# Patient Record
Sex: FEMALE | Race: WHITE
[De-identification: names, ages, dates, MRNs, and addresses within clinical notes are randomized per-mention and may not be internally consistent; named-entity substitution may affect disease eponyms.]

---

## 2019-02-04 ENCOUNTER — HOSPITAL ENCOUNTER (OUTPATIENT)
Age: 39
End: 2019-02-04
Payer: COMMERCIAL

## 2019-02-04 DIAGNOSIS — D64.9: Primary | ICD-10-CM

## 2019-02-04 DIAGNOSIS — E55.9: ICD-10-CM

## 2019-02-04 DIAGNOSIS — R53.83: ICD-10-CM

## 2019-02-04 LAB
ALBUMIN LEVEL: 4 GM/DL (ref 3.4–5)
ALBUMIN/GLOB SERPL: 1.1 {RATIO} (ref 1.1–1.8)
ALP ISO SERPL-ACNC: 96 U/L (ref 46–116)
ALT SERPLBLD-CCNC: 42 U/L (ref 12–78)
ANION GAP SERPL CALC-SCNC: 16.8 MEQ/L (ref 5–15)
AST SERPL QL: 19 U/L (ref 15–37)
BILIRUBIN,TOTAL: 0.3 MG/DL (ref 0.2–1)
BUN SERPL-MCNC: 11 MG/DL (ref 7–18)
CALCIUM SPEC-MCNC: 9 MG/DL (ref 8.5–10.1)
CHLORIDE SPEC-SCNC: 99 MMOL/L (ref 98–107)
CHOLEST SPEC-SCNC: 188 MG/DL (ref 140–200)
CO2 SERPL-SCNC: 25 MMOL/L (ref 21–32)
CREAT BLD-SCNC: 0.88 MG/DL (ref 0.55–1.02)
ESTIMATED GLOMERULAR FILT RATE: 73 ML/MIN (ref 60–?)
FERRITIN SERPL-MCNC: 42 NG/ML (ref 8–388)
GFR (AFRICAN AMERICAN): 88 ML/MIN (ref 60–?)
GLOBULIN SER CALC-MCNC: 3.8 GM/DL (ref 1.3–3.2)
GLUCOSE: 131 MG/DL (ref 74–106)
HBA1C MFR BLD: 6.7 % (ref 0–7)
HCT VFR BLD CALC: 41.9 % (ref 37–47)
HDLC SERPL-MCNC: 37 MG/DL (ref 29–89)
HGB BLD-MCNC: 13.8 G/DL (ref 12.2–16.2)
MCHC RBC-ENTMCNC: 32.9 G/DL (ref 31.8–35.4)
MCV RBC: 83.4 FL (ref 81–99)
MEAN CORPUSCULAR HEMOGLOBIN: 27.5 PG (ref 27–31.2)
PLATELET # BLD: 342 K/MM3 (ref 142–424)
POTASSIUM: 3.8 MMOL/L (ref 3.5–5.1)
PROT SERPL-MCNC: 7.8 GM/DL (ref 6.4–8.2)
RBC # BLD AUTO: 5.02 M/MM3 (ref 4.2–5.4)
SODIUM SPEC-SCNC: 137 MMOL/L (ref 136–145)
TRIGLYCERIDES: 199 MG/DL (ref 30–200)
TSH SERPL-ACNC: 3.98 UIU/ML (ref 0.36–3.74)
WBC # BLD AUTO: 9.3 K/MM3 (ref 4.8–10.8)

## 2019-02-04 PROCEDURE — 80061 LIPID PANEL: CPT

## 2019-02-04 PROCEDURE — 36415 COLL VENOUS BLD VENIPUNCTURE: CPT

## 2019-02-04 PROCEDURE — 82728 ASSAY OF FERRITIN: CPT

## 2019-02-04 PROCEDURE — 85025 COMPLETE CBC W/AUTO DIFF WBC: CPT

## 2019-02-04 PROCEDURE — 82652 VIT D 1 25-DIHYDROXY: CPT

## 2019-02-04 PROCEDURE — 80053 COMPREHEN METABOLIC PANEL: CPT

## 2019-02-04 PROCEDURE — 82607 VITAMIN B-12: CPT

## 2019-02-04 PROCEDURE — 84443 ASSAY THYROID STIM HORMONE: CPT

## 2019-02-04 PROCEDURE — 83036 HEMOGLOBIN GLYCOSYLATED A1C: CPT

## 2019-02-05 LAB — VITAMIN B12: 590 PG/ML (ref 232–1245)

## 2019-05-01 ENCOUNTER — HOSPITAL ENCOUNTER (OUTPATIENT)
Age: 39
End: 2019-05-01
Payer: COMMERCIAL

## 2019-05-01 DIAGNOSIS — M20.42: ICD-10-CM

## 2019-05-01 DIAGNOSIS — M20.41: Primary | ICD-10-CM

## 2019-05-01 PROCEDURE — 73630 X-RAY EXAM OF FOOT: CPT

## 2019-05-31 ENCOUNTER — HOSPITAL ENCOUNTER (EMERGENCY)
Age: 39
Discharge: HOME | End: 2019-05-31
Payer: COMMERCIAL

## 2019-05-31 VITALS
TEMPERATURE: 98.2 F | SYSTOLIC BLOOD PRESSURE: 128 MMHG | RESPIRATION RATE: 18 BRPM | HEART RATE: 86 BPM | DIASTOLIC BLOOD PRESSURE: 78 MMHG | OXYGEN SATURATION: 96 %

## 2019-05-31 VITALS
DIASTOLIC BLOOD PRESSURE: 78 MMHG | RESPIRATION RATE: 18 BRPM | HEART RATE: 86 BPM | OXYGEN SATURATION: 96 % | TEMPERATURE: 98.2 F | SYSTOLIC BLOOD PRESSURE: 128 MMHG

## 2019-05-31 VITALS — BODY MASS INDEX: 41.3 KG/M2
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
## 2019-05-31 DIAGNOSIS — S61.031A: Primary | ICD-10-CM
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
## 2019-05-31 LAB
ALBUMIN LEVEL: 4 GM/DL (ref 3.4–5)
ALP ISO SERPL-ACNC: 82 U/L (ref 46–116)
ALT SERPLBLD-CCNC: 33 U/L (ref 12–78)
APTT PPP: 27.9 SECONDS (ref 23.6–34)
AST SERPL QL: 18 U/L (ref 15–37)
BILIRUB DIRECT SERPL-MCNC: 0.1 MG/DL (ref 0–0.2)
BILIRUB INDIRECT SERPL-MCNC: 0.3 MG/DL (ref 0–0.9)
BILIRUBIN,TOTAL: 0.4 MG/DL (ref 0.2–1)
HCT VFR BLD CALC: 40.8 % (ref 37–47)
HGB BLD-MCNC: 13.3 G/DL (ref 12.2–16.2)
INR PPP: 1.02 (ref 0.9–1.1)
MCHC RBC-ENTMCNC: 32.5 G/DL (ref 31.8–35.4)
MCV RBC: 90 FL (ref 81–99)
MEAN CORPUSCULAR HEMOGLOBIN: 29.3 PG (ref 27–31.2)
PLATELET # BLD: 317 K/MM3 (ref 142–424)
PROT SERPL-MCNC: 7.8 GM/DL (ref 6.4–8.2)
PT BLD: 10.6 SECONDS (ref 9.4–11.8)
RBC # BLD AUTO: 4.54 M/MM3 (ref 4.2–5.4)
WBC # BLD AUTO: 8.4 K/MM3 (ref 4.8–10.8)

## 2019-05-31 PROCEDURE — 85730 THROMBOPLASTIN TIME PARTIAL: CPT

## 2019-05-31 PROCEDURE — 86703 HIV-1/HIV-2 1 RESULT ANTBDY: CPT

## 2019-05-31 PROCEDURE — 87340 HEPATITIS B SURFACE AG IA: CPT

## 2019-05-31 PROCEDURE — 85610 PROTHROMBIN TIME: CPT

## 2019-05-31 PROCEDURE — 87380 HEPATITIS DELTA AGENT AG IA: CPT

## 2019-05-31 PROCEDURE — 80076 HEPATIC FUNCTION PANEL: CPT

## 2019-05-31 PROCEDURE — 85025 COMPLETE CBC W/AUTO DIFF WBC: CPT

## 2019-05-31 PROCEDURE — 99282 EMERGENCY DEPT VISIT SF MDM: CPT

## 2019-05-31 PROCEDURE — G0432 EIA HIV-1/HIV-2 SCREEN: HCPCS

## 2019-05-31 PROCEDURE — 86706 HEP B SURFACE ANTIBODY: CPT

## 2019-05-31 PROCEDURE — 36415 COLL VENOUS BLD VENIPUNCTURE: CPT

## 2019-05-31 NOTE — PC.NURSE
contacted lab to check on status of them obtaining blood work on pt, spoke with Maru who stated she thought someone had already come down.  Stated she will send someone down ASAP

## 2019-06-01 LAB
HCV AB SER IA-ACNC: <0.1 S/CO RATIO (ref 0–0.9)
HIV-1 AB CHG: (no result)
HIV-2 AB CHG: (no result)
HIV1 RNA CHG: (no result)

## 2020-01-20 ENCOUNTER — HOSPITAL ENCOUNTER (OUTPATIENT)
Age: 40
End: 2020-01-20
Payer: COMMERCIAL

## 2020-01-20 DIAGNOSIS — J06.9: Primary | ICD-10-CM

## 2020-01-20 LAB
HCT VFR BLD CALC: 40.1 % (ref 37–47)
HGB BLD-MCNC: 12.9 G/DL (ref 12.2–16.2)
MCHC RBC-ENTMCNC: 32.2 G/DL (ref 31.8–35.4)
MCV RBC: 89.7 FL (ref 81–99)
MEAN CORPUSCULAR HEMOGLOBIN: 28.8 PG (ref 27–31.2)
PLATELET # BLD: 365 K/MM3 (ref 142–424)
RBC # BLD AUTO: 4.47 M/MM3 (ref 4.2–5.4)
WBC # BLD AUTO: 9.4 K/MM3 (ref 4.8–10.8)

## 2020-01-20 PROCEDURE — 85025 COMPLETE CBC W/AUTO DIFF WBC: CPT

## 2020-01-20 PROCEDURE — 36415 COLL VENOUS BLD VENIPUNCTURE: CPT

## 2020-02-03 ENCOUNTER — HOSPITAL ENCOUNTER (OUTPATIENT)
Age: 40
End: 2020-02-03
Payer: COMMERCIAL

## 2020-02-03 DIAGNOSIS — R05: Primary | ICD-10-CM

## 2020-02-03 DIAGNOSIS — B97.4: ICD-10-CM

## 2020-02-03 LAB
HCT VFR BLD CALC: 39.6 % (ref 37–47)
HGB BLD-MCNC: 13.2 G/DL (ref 12.2–16.2)
MCHC RBC-ENTMCNC: 33.2 G/DL (ref 31.8–35.4)
MCV RBC: 88.9 FL (ref 81–99)
MEAN CORPUSCULAR HEMOGLOBIN: 29.5 PG (ref 27–31.2)
PLATELET # BLD: 316 K/MM3 (ref 142–424)
RBC # BLD AUTO: 4.45 M/MM3 (ref 4.2–5.4)
WBC # BLD AUTO: 9.2 K/MM3 (ref 4.8–10.8)

## 2020-02-03 PROCEDURE — 87486 CHLMYD PNEUM DNA AMP PROBE: CPT

## 2020-02-03 PROCEDURE — 87633 RESP VIRUS 12-25 TARGETS: CPT

## 2020-02-03 PROCEDURE — 87798 DETECT AGENT NOS DNA AMP: CPT

## 2020-02-03 PROCEDURE — 36415 COLL VENOUS BLD VENIPUNCTURE: CPT

## 2020-02-03 PROCEDURE — 87581 M.PNEUMON DNA AMP PROBE: CPT

## 2020-02-03 PROCEDURE — 85025 COMPLETE CBC W/AUTO DIFF WBC: CPT

## 2020-02-03 PROCEDURE — 71046 X-RAY EXAM CHEST 2 VIEWS: CPT

## 2020-05-22 ENCOUNTER — HOSPITAL ENCOUNTER (OUTPATIENT)
Dept: HOSPITAL 22 - UTC.OUT | Age: 40
End: 2020-05-22
Payer: COMMERCIAL

## 2020-05-22 DIAGNOSIS — Z03.818: Primary | ICD-10-CM

## 2020-05-22 LAB
HCT VFR BLD CALC: 42.9 % (ref 37–47)
HGB BLD-MCNC: 15 G/DL (ref 12.2–16.2)
MCHC RBC-ENTMCNC: 35 G/DL (ref 31.8–35.4)
MCV RBC: 85.7 FL (ref 81–99)
MEAN CORPUSCULAR HEMOGLOBIN: 30 PG (ref 27–31.2)
PLATELET # BLD: 310 K/MM3 (ref 142–424)
RBC # BLD AUTO: 5.01 M/MM3 (ref 4.2–5.4)
WBC # BLD AUTO: 5.7 K/MM3 (ref 4.8–10.8)

## 2020-05-22 PROCEDURE — 85025 COMPLETE CBC W/AUTO DIFF WBC: CPT

## 2020-05-22 PROCEDURE — 87798 DETECT AGENT NOS DNA AMP: CPT

## 2020-05-22 PROCEDURE — 36415 COLL VENOUS BLD VENIPUNCTURE: CPT

## 2020-05-22 PROCEDURE — 87581 M.PNEUMON DNA AMP PROBE: CPT

## 2020-05-22 PROCEDURE — 87633 RESP VIRUS 12-25 TARGETS: CPT

## 2020-08-04 ENCOUNTER — HOSPITAL ENCOUNTER (OUTPATIENT)
Age: 40
End: 2020-08-04
Payer: COMMERCIAL

## 2020-08-04 DIAGNOSIS — Z20.828: Primary | ICD-10-CM

## 2020-08-04 PROCEDURE — U0003 INFECTIOUS AGENT DETECTION BY NUCLEIC ACID (DNA OR RNA); SEVERE ACUTE RESPIRATORY SYNDROME CORONAVIRUS 2 (SARS-COV-2) (CORONAVIRUS DISEASE [COVID-19]), AMPLIFIED PROBE TECHNIQUE, MAKING USE OF HIGH THROUGHPUT TECHNOLOGIES AS DESCRIBED BY CMS-2020-01-R: HCPCS

## 2020-11-11 ENCOUNTER — HOSPITAL ENCOUNTER (OUTPATIENT)
Age: 40
End: 2020-11-11
Payer: COMMERCIAL

## 2020-11-11 DIAGNOSIS — E11.9: Primary | ICD-10-CM

## 2020-11-11 DIAGNOSIS — E87.1: ICD-10-CM

## 2020-11-11 DIAGNOSIS — E78.2: ICD-10-CM

## 2020-11-11 LAB
ANION GAP SERPL CALC-SCNC: 14.6 MEQ/L (ref 5–15)
BUN SERPL-MCNC: 16 MG/DL (ref 7–17)
CALCIUM SPEC-MCNC: 9.6 MG/DL (ref 8.4–10.2)
CHLORIDE SPEC-SCNC: 102 MMOL/L (ref 98–107)
CHOLEST SPEC-SCNC: 144 MG/DL (ref 140–200)
CO2 SERPL-SCNC: 26 MMOL/L (ref 22–30)
CREAT BLD-SCNC: 0.8 MG/DL (ref 0.52–1.04)
ESTIMATED GLOMERULAR FILT RATE: 80 ML/MIN (ref 60–?)
GFR (AFRICAN AMERICAN): 97 ML/MIN (ref 60–?)
GLUCOSE: 90 MG/DL (ref 74–100)
HBA1C MFR BLD: 6 % (ref 4–6)
HDLC SERPL-MCNC: 44 MG/DL (ref 40–60)
POTASSIUM: 4.6 MMOL/L (ref 3.5–5.1)
SODIUM SPEC-SCNC: 138 MMOL/L (ref 136–145)
TRIGLYCERIDES: 107 MG/DL (ref 30–150)

## 2020-11-11 PROCEDURE — 80061 LIPID PANEL: CPT

## 2020-11-11 PROCEDURE — 83036 HEMOGLOBIN GLYCOSYLATED A1C: CPT

## 2020-11-11 PROCEDURE — 80048 BASIC METABOLIC PNL TOTAL CA: CPT

## 2020-11-19 ENCOUNTER — HOSPITAL ENCOUNTER (OUTPATIENT)
Age: 40
End: 2020-11-19
Payer: COMMERCIAL

## 2020-11-19 DIAGNOSIS — Z12.31: Primary | ICD-10-CM

## 2020-11-19 PROCEDURE — 77067 SCR MAMMO BI INCL CAD: CPT

## 2020-11-19 PROCEDURE — 77063 BREAST TOMOSYNTHESIS BI: CPT

## 2020-12-02 ENCOUNTER — HOSPITAL ENCOUNTER (OUTPATIENT)
Age: 40
End: 2020-12-02
Payer: COMMERCIAL

## 2020-12-02 DIAGNOSIS — R92.8: Primary | ICD-10-CM

## 2020-12-02 PROCEDURE — 76641 ULTRASOUND BREAST COMPLETE: CPT

## 2021-04-12 ENCOUNTER — HOSPITAL ENCOUNTER (OUTPATIENT)
Age: 41
End: 2021-04-12
Payer: COMMERCIAL

## 2021-04-12 DIAGNOSIS — M25.562: Primary | ICD-10-CM

## 2021-04-12 DIAGNOSIS — M25.561: ICD-10-CM

## 2021-04-12 PROCEDURE — 73564 X-RAY EXAM KNEE 4 OR MORE: CPT

## 2021-06-09 ENCOUNTER — HOSPITAL ENCOUNTER (OUTPATIENT)
Age: 41
End: 2021-06-09
Payer: COMMERCIAL

## 2021-06-09 DIAGNOSIS — N63.20: ICD-10-CM

## 2021-06-09 DIAGNOSIS — R92.8: Primary | ICD-10-CM

## 2021-06-09 PROCEDURE — 76641 ULTRASOUND BREAST COMPLETE: CPT

## 2021-06-09 PROCEDURE — G0279 TOMOSYNTHESIS, MAMMO: HCPCS

## 2021-06-09 PROCEDURE — 77065 DX MAMMO INCL CAD UNI: CPT

## 2021-06-09 PROCEDURE — 77061 BREAST TOMOSYNTHESIS UNI: CPT

## 2021-08-16 PROCEDURE — U0004 COV-19 TEST NON-CDC HGH THRU: HCPCS | Performed by: NURSE PRACTITIONER
